# Patient Record
Sex: FEMALE | Race: WHITE | Employment: FULL TIME | URBAN - METROPOLITAN AREA
[De-identification: names, ages, dates, MRNs, and addresses within clinical notes are randomized per-mention and may not be internally consistent; named-entity substitution may affect disease eponyms.]

---

## 2020-04-17 ENCOUNTER — TELEMEDICINE (OUTPATIENT)
Dept: DERMATOLOGY | Age: 20
End: 2020-04-17
Payer: COMMERCIAL

## 2020-04-17 PROCEDURE — 99202 OFFICE O/P NEW SF 15 MIN: CPT | Performed by: DERMATOLOGY

## 2020-04-17 RX ORDER — TRIAMCINOLONE ACETONIDE 1 MG/G
CREAM TOPICAL
Qty: 80 G | Refills: 1 | Status: SHIPPED | OUTPATIENT
Start: 2020-04-17

## 2020-04-17 RX ORDER — VALACYCLOVIR HYDROCHLORIDE 500 MG/1
500 TABLET, FILM COATED ORAL 2 TIMES DAILY
Qty: 10 TABLET | Refills: 0 | Status: SHIPPED | OUTPATIENT
Start: 2020-04-17 | End: 2020-04-22

## 2020-05-05 ENCOUNTER — TELEMEDICINE (OUTPATIENT)
Dept: DERMATOLOGY | Age: 20
End: 2020-05-05
Payer: COMMERCIAL

## 2020-05-05 PROCEDURE — 99213 OFFICE O/P EST LOW 20 MIN: CPT | Performed by: DERMATOLOGY

## 2020-05-05 RX ORDER — PIMECROLIMUS 10 MG/G
CREAM TOPICAL
Qty: 30 G | Refills: 2 | Status: SHIPPED | OUTPATIENT
Start: 2020-05-05

## 2020-05-05 RX ORDER — DOXYCYCLINE HYCLATE 100 MG/1
CAPSULE ORAL
Qty: 60 CAPSULE | Refills: 0 | Status: SHIPPED | OUTPATIENT
Start: 2020-05-05 | End: 2020-07-14 | Stop reason: ALTCHOICE

## 2020-05-05 NOTE — PATIENT INSTRUCTIONS
Doxycycline twice daily with food and water  elidel cream twice daily    Follow up in person in 1 month

## 2020-05-05 NOTE — PROGRESS NOTES
TELEHEALTH EVALUATION -- Audio/Visual (During [facility])    Dermatology Patient Note  [address]  [address] [address]  Dept: [phone]  Dept Fax: [phone]      VISIT DATE: 5/5/2020   REFERRING PROVIDER: No ref. provider found      Lencoh Figueroa is a 23 y.o. female  who presents today in the office for:    No chief complaint on file. HISTORY OF PRESENT ILLNESS:  Patient presents for f/u facial papular rash  Interval history: minimal improvement. Some areas flattened but mostly still there. Denies picking or squeezing. Recently switched cleansers, now using drunk elephant gel cleanser  Current treatment and adherence: completed valtrex course and continues topical mupirocin  Prior treatments tried: see initial HPI      CURRENT MEDICATIONS:   Current Outpatient Medications   Medication Sig Dispense Refill    doxycycline hyclate (VIBRAMYCIN) 100 MG capsule Take 1 pill twice daily with food 60 capsule 0    pimecrolimus (ELIDEL) 1 % cream Apply topically 2 times daily. 30 g 2    triamcinolone (KENALOG) 0.1 % cream Apply to rash on abdomen twice daily (not face, armpit or groin) 80 g 1     No current facility-administered medications for this visit. ALLERGIES:   No Known Allergies    SOCIAL HISTORY:  Social History     Tobacco Use    Smoking status: Not on file   Substance Use Topics    Alcohol use: Not on file       REVIEW OF SYSTEMS:  Review of Systems  Skin:Denies any new changing, growing or bleeding lesions or rashes except as described in the HPI     PHYSICAL EXAM:   There were no vitals taken for this visit. General Exam:  General Appearance: No acute distress, Well nourished     Neuro: Alert and oriented to person, place and time  Psych: Normal affect   Lymph Node: Not performed    Cutaneous Exam: Performed as documented in clinic note below.   Sun-exposed skin,which includes the head/face, neck, both arms, digits and/or nails was examined. Due to this being a TeleHealth encounter, evaluation of the following organ systems is limited: Vitals/Constitutional/EENT/Resp/CV/GI//MS/Neuro/Skin/Heme-Lymph-Imm. In particular examination of the skin is limited by video quality and patient available technology. Pertinent Physical Exam Findings:  Physical Exam  Crusted papule of right upper vermillion border and pink patch of nasal columela with surrounding aceniform papules on vermillion border and acneiform nodules of right cheek    Medical Necessity of Exam Performed:   Distribution of patient concerns    Additional Diagnostic Testing performed during exam: Not performed ,  Not performed    ASSESSMENT:   Diagnosis Orders   1. Perioral dermatitis  doxycycline hyclate (VIBRAMYCIN) 100 MG capsule    pimecrolimus (ELIDEL) 1 % cream       Plan of Action is as Follows:  Assessment    1. Facial papular eruption not adequately responsive to valtrex and mupirocin. Ddx now includes perioral dermatitis and atypical acne  - trial of treatment for perioral dermatitis  - doxycycline hyclate (VIBRAMYCIN) 100 MG capsule; Take 1 pill twice daily with food  Dispense: 60 capsule; Refill: 0  - pimecrolimus (ELIDEL) 1 % cream; Apply topically 2 times daily. Dispense: 30 g; Refill: 2    RTC 1 month in person          There are no Patient Instructions on file for this visit. Photo surveillance performed: No    Follow-up: No follow-ups on file. Pursuant to the emergency declaration under the [address] [place] [address], [facility] waiver authority and the [employer] Act, this Virtual  Visit was conducted, with patient's consent, to reduce the patient's risk of exposure to COVID-19 and provide continuity of care for an established patient. Services were provided through a video synchronous discussion virtually to substitute for in-person clinic visit.

## 2020-05-21 ENCOUNTER — TELEPHONE (OUTPATIENT)
Dept: ADMINISTRATIVE | Age: 20
End: 2020-05-21

## 2020-05-21 NOTE — TELEPHONE ENCOUNTER
Patient calling to schedule F2F f/u appointment as instructed by Sergey Scott at [place] [id number] on 5/5/20. Writer unable to reach staff via [phone] [employer] on 5/21 for permission to schedule in a \"Hold\" slot. Writer informed patient clinical staff will contact her to schedule f/u appointment.

## 2020-05-21 NOTE — TELEPHONE ENCOUNTER
Future Appointments   Date Time Provider Eli Hernandez   6/3/2020  8:45 AM Buckner Osgood, MD  derm [facility]

## 2020-06-03 ENCOUNTER — OFFICE VISIT (OUTPATIENT)
Dept: DERMATOLOGY | Age: 20
End: 2020-06-03
Payer: COMMERCIAL

## 2020-06-03 VITALS
OXYGEN SATURATION: 98 % | TEMPERATURE: 97.8 F | BODY MASS INDEX: 27.47 KG/M2 | DIASTOLIC BLOOD PRESSURE: 83 MMHG | HEART RATE: 84 BPM | SYSTOLIC BLOOD PRESSURE: 120 MMHG | WEIGHT: 175 LBS | HEIGHT: 67 IN

## 2020-06-03 PROCEDURE — 10060 I&D ABSCESS SIMPLE/SINGLE: CPT | Performed by: DERMATOLOGY

## 2020-06-03 PROCEDURE — 99213 OFFICE O/P EST LOW 20 MIN: CPT | Performed by: DERMATOLOGY

## 2020-06-03 RX ORDER — TRANEXAMIC ACID 650 1/1
1300 TABLET ORAL 3 TIMES DAILY
COMMUNITY
Start: 2019-05-15

## 2020-06-03 RX ORDER — NORETHINDRONE AND ETHINYL ESTRADIOL 0.4-0.035
KIT ORAL
COMMUNITY
Start: 2018-02-19

## 2020-06-03 RX ORDER — FERROUS SULFATE 325(65) MG
325 TABLET ORAL
COMMUNITY
Start: 2018-08-16

## 2020-06-03 RX ORDER — ASCORBIC ACID 250 MG
2 TABLET ORAL DAILY
COMMUNITY

## 2020-06-03 RX ORDER — SULFACETAMIDE SODIUM 100 MG/ML
LOTION TOPICAL
Qty: 118 ML | Refills: 2 | Status: SHIPPED | OUTPATIENT
Start: 2020-06-03 | End: 2020-07-14 | Stop reason: SDUPTHER

## 2020-06-03 NOTE — PROGRESS NOTES
Smokeless tobacco: Never Used   Substance Use Topics    Alcohol use: Not on file       REVIEW OF SYSTEMS:  Review of Systems  Skin: Denies any new changing, growing orbleeding lesions or rashes except as described in the HPI   Constitutional: Denies fevers, chills, and malaise. PHYSICAL EXAM:   /83 (Site: Left Upper Arm, Position: Sitting, Cuff Size: Medium Adult)   Pulse 84   Temp 97.8 °F (36.6 °C)   Ht 5' 7\" (1.702 m)   Wt 175 lb (79.4 kg)   LMP 05/10/2020 (Exact Date)   SpO2 98%   BMI 27.41 kg/m²     General Exam:  General Appearance: No acute distress, Well nourished     Neuro: Alert and oriented to person, place and time  Psych: Normal affect   Lymph Node: Not performed    Cutaneous Exam: Performed as documented in clinic note below. Sun-exposed skin, which includes the head/face, neck, both arms, digits and/or nails was examined. Pertinent Physical Exam Findings:  Physical Exam  Upper lip with ill-defined erythematous patches spreading from true lip across vermillion border to cutaneous lip, with few subtle inlying papules, especially at vermillion    Photo surveillance performed: No    Medical Necessity of Exam Performed:   Distribution of patient concerns    Additional Diagnostic Testing performed during exam: Not performed ,  Not performed    ASSESSMENT:   Diagnosis Orders   1. Cheilitis  HSV PCR    Culture, Wound    Sulfacetamide Sodium, Acne, 10 % LOTN       Plan of Action is as Follows:  Assessment   1. Dermatitis of central face, now focused to upper lip and resolved on columella. Has not responded completely any of treatments thus far including valtrex, mupirocin, doxycycline and elidel. Ddx includes persistent perioral dermatitis, irritant/allergic cheilitis, perleche. During previous episodes has tried acne topicals and oral antibiotics without improvement  - culture today, start sulfacetamide sodium  - HSV PCR; Future  - Culture, Wound;  Future  - completely doxycycline, then

## 2020-06-04 ENCOUNTER — HOSPITAL ENCOUNTER (OUTPATIENT)
Age: 20
Setting detail: SPECIMEN
Discharge: HOME | End: 2020-06-04
Payer: COMMERCIAL

## 2020-06-04 ENCOUNTER — TELEPHONE (OUTPATIENT)
Dept: DERMATOLOGY | Age: 20
End: 2020-06-04

## 2020-06-05 NOTE — TELEPHONE ENCOUNTER
Specimens are back, I did not know that you could not send Cultures to [facility] because pt hat [employer]. Would you like for pt come back on dispose of the cultures. I apologize that was my mistake, I did not know you could send these only to [facility].

## 2020-06-06 LAB
CULTURE: ABNORMAL
DIRECT EXAM: ABNORMAL
DIRECT EXAM: ABNORMAL
Lab: ABNORMAL
SPECIMEN DESCRIPTION: ABNORMAL

## 2020-06-07 NOTE — TELEPHONE ENCOUNTER
Please inform patient that culture grew normal skin elizabeth. Continue sodium sulfacetamide lotion and let me know how it goes.

## 2020-06-11 LAB
HSV BY PCR: NOT DETECTED
HSV SOURCE: NORMAL

## 2020-07-14 ENCOUNTER — OFFICE VISIT (OUTPATIENT)
Dept: DERMATOLOGY | Age: 20
End: 2020-07-14
Payer: COMMERCIAL

## 2020-07-14 VITALS
OXYGEN SATURATION: 98 % | HEART RATE: 73 BPM | BODY MASS INDEX: 27.94 KG/M2 | SYSTOLIC BLOOD PRESSURE: 130 MMHG | WEIGHT: 178 LBS | DIASTOLIC BLOOD PRESSURE: 84 MMHG | HEIGHT: 67 IN | TEMPERATURE: 97.1 F

## 2020-07-14 PROCEDURE — 99213 OFFICE O/P EST LOW 20 MIN: CPT | Performed by: DERMATOLOGY

## 2020-07-14 RX ORDER — SULFACETAMIDE SODIUM 100 MG/ML
LOTION TOPICAL
Qty: 118 ML | Refills: 2 | Status: SHIPPED | OUTPATIENT
Start: 2020-07-14

## 2020-07-14 RX ORDER — FLUCONAZOLE 200 MG/1
TABLET ORAL
Qty: 2 TABLET | Refills: 0 | Status: SHIPPED | OUTPATIENT
Start: 2020-07-14 | End: 2020-10-07 | Stop reason: ALTCHOICE

## 2020-07-14 RX ORDER — DOXYCYCLINE HYCLATE 100 MG/1
CAPSULE ORAL
Qty: 30 CAPSULE | Refills: 2 | Status: SHIPPED | OUTPATIENT
Start: 2020-07-14

## 2020-07-14 NOTE — PROGRESS NOTES
Dermatology Patient Note  [facility] [address]  [address]  [facility] [address]  Dept: [phone]  Dept Fax: [phone]: 7/14/2020   REFERRING PROVIDER: No ref. provider found      Red Bowden is a 23 y.o. female  who presents today in the office for:    Follow-up (pt is not resolved; completed doxy x3 weeks; did not get sulfacetamide lotion, still using elidel and mupirocin with no relief)      HISTORY OF PRESENT ILLNESS:  Patient presents for f/u acneiform eruption of face  Interval history: patient continues to have outbreaks on face and especially upper lip and nose, start as bump and heal as red spots. Patient reports that she got a recurrence of rash of umbilicus when face flared  Current treatment and adherence: elidel and mupirocin not helping. Did not get sodium sulfacetamide because [place] left [place] [place]  Prior treatments tried: see initial HPI  Also complains of irritated skin tags and would like treatment    CURRENT MEDICATIONS:   Current Outpatient Medications   Medication Sig Dispense Refill    mupirocin (BACTROBAN) 2 % ointment Apply topically 3 times daily Apply topically 3 times daily.  Sulfacetamide Sodium, Acne, 10 % LOTN Apply daily to rash on face 118 mL 2    metroNIDAZOLE (METROCREAM) 0.75 % cream Apply daily to rash on face 45 g 3    fluconazole (DIFLUCAN) 200 MG tablet Take 200 mg once. Repeat in 1 week 2 tablet 0    doxycycline hyclate (VIBRAMYCIN) 100 MG capsule Take 1 pill daily with food 30 capsule 2    ferrous sulfate (IRON 325) 325 (65 Fe) MG tablet Take 325 mg by mouth      ascorbic acid (VITAMIN C) 250 MG tablet Take 2 tablets by mouth daily      norethindrone-ethinyl estradiol (BALZIVA) 0.4-35 MG-MCG per tablet       tranexamic acid (LYSTEDA) 650 MG TABS tablet Take 1,300 mg by mouth 3 times daily      pimecrolimus (ELIDEL) 1 % cream Apply topically 2 times daily.  30 g 2    triamcinolone (KENALOG) 0.1 elidel, valtrex  HSV PCR and wound culture were negative  - restart doxycycline for a longer course at 100 mg daily  - start sodium sulfacetamide and metrocream  - add two doses of fluconazole  - Sulfacetamide Sodium, Acne, 10 % LOTN; Apply daily to rash on face  Dispense: 118 mL; Refill: 2  - metroNIDAZOLE (METROCREAM) 0.75 % cream; Apply daily to rash on face  Dispense: 45 g; Refill: 3  - fluconazole (DIFLUCAN) 200 MG tablet; Take 200 mg once. Repeat in 1 week  Dispense: 2 tablet; Refill: 0  - doxycycline hyclate (VIBRAMYCIN) 100 MG capsule; Take 1 pill daily with food  Dispense: 30 capsule; Refill: 2    3. Inflamed acrochordon  - schedule for removal (send to path    RTC 2 months or for skin tag removal            Patient Instructions   -Start Metrocream daily  -Start Sulfacetamide daily  -Start Doxycycline once a day. -Take oral Diflucan one this week and repeat in one week  -[facility] have a [facility]  -Follow up in 2 months  -Follow up for skin tag removal        Follow-up: No follow-ups on file. This note was created with the assistance of a speech-recognition program.  Although the intention is to generate a document that actually reflects the content of the visit, no guarantees can be provided that every mistake has been identified and corrected byediting.     Electronically signed by Meenakshi Kirby MD on 6/3/20 at 9:28 AM EDT

## 2020-07-28 ENCOUNTER — OFFICE VISIT (OUTPATIENT)
Dept: DERMATOLOGY | Age: 20
End: 2020-07-28
Payer: COMMERCIAL

## 2020-07-28 VITALS
WEIGHT: 178 LBS | HEART RATE: 75 BPM | HEIGHT: 67 IN | SYSTOLIC BLOOD PRESSURE: 126 MMHG | BODY MASS INDEX: 27.94 KG/M2 | OXYGEN SATURATION: 98 % | DIASTOLIC BLOOD PRESSURE: 85 MMHG

## 2020-07-28 PROCEDURE — 11200 RMVL SKIN TAGS UP TO&INC 15: CPT | Performed by: DERMATOLOGY

## 2020-07-28 RX ORDER — LIDOCAINE HYDROCHLORIDE AND EPINEPHRINE 10; 10 MG/ML; UG/ML
1 INJECTION, SOLUTION INFILTRATION; PERINEURAL ONCE
Status: COMPLETED | OUTPATIENT
Start: 2020-07-28 | End: 2020-07-28

## 2020-07-28 RX ADMIN — LIDOCAINE HYDROCHLORIDE AND EPINEPHRINE 1 ML: 10; 10 INJECTION, SOLUTION INFILTRATION; PERINEURAL at 09:57

## 2020-07-28 NOTE — PROGRESS NOTES
Chief Complaint   Patient presents with    Follow-up     skin tags         Dermatology Procedure Note   [address]  [facility] [address]  Dept: [phone]  Dept Fax: [phone]      Procedure Date: 7/28/2020  Procedure Time: 9:36 AM    Procedure Practitioner: Ok Smith MD    Procedure: Skin Tag Removal    Pre-Procedure Diagnosis: Inflamed acrochordon    Post-Procedure Diagnosis: Same as Pre-Procedure Diagnosis    Informed Consent: The procedure and its risks were explained including but not limited to pain, bleeding, infection, permanent scar, permanent pigment alteration and recurrence. Consent to proceed with the procedure was obtained from the patient or the parent by the practitioner    Time Out:  A time out was conducted immediately before starting the procedure that confirmed a final verification of the correct patient, correct procedure, and correct site. Procedure Details:  After cleaning with alcohol and anesthetizing with lidocaine 1% with epinephrine, 9 irritated and inflamed skin tag in the buttock was removed with iris scissors. Hemostasis was achieved with pressure and aluminum chloride. Vaseline and a bandage were applied.     Procedure Performed By: Ok Smith MD    Estimated Blood Loss: Minimal    Pathologic Specimen: H&E (single lesion sent to path)    Procedure Tolerance: Good    Complication(s): None    Electronically signed by Ok Smith MD on 7/28/20 at 9:36 AM EDT

## 2020-07-28 NOTE — PATIENT INSTRUCTIONS

## 2020-09-25 ENCOUNTER — TELEMEDICINE (OUTPATIENT)
Dept: DERMATOLOGY | Age: 20
End: 2020-09-25
Payer: COMMERCIAL

## 2020-09-25 PROCEDURE — 99213 OFFICE O/P EST LOW 20 MIN: CPT | Performed by: DERMATOLOGY

## 2020-09-25 NOTE — PROGRESS NOTES
TELEHEALTH EVALUATION -- Audio/Visual (During [facility]-81 public health emergency)    Dermatology Patient Note  [address]  [address]  Dept: [phone]  Dept Fax: [phone]      VISIT DATE: 9/25/2020   REFERRING PROVIDER: No ref. provider found      Mil Donis is a 23 y.o. female  who presents today in the office for:    No chief complaint on file. HISTORY OF PRESENT ILLNESS:  Patient presents for f/u acneiform facial eruption  Interval history: patient states the rash around her mouth and lips is much improved, but she still has the persistent acne bump on her right cheek  Current treatment and adherence: doxycycline 100 mg daily, sodium sulfacetamide lotion and metrocream. Finished diflucan x 2 doses and states that it really got better after the second dose. The rash in her navel recurred. The triamcinolone helps but then it comes back  Prior treatments tried: see initial HPI      CURRENT MEDICATIONS:   Current Outpatient Medications   Medication Sig Dispense Refill    mupirocin (BACTROBAN) 2 % ointment Apply topically 3 times daily Apply topically 3 times daily.  Sulfacetamide Sodium, Acne, 10 % LOTN Apply daily to rash on face 118 mL 2    metroNIDAZOLE (METROCREAM) 0.75 % cream Apply daily to rash on face 45 g 3    fluconazole (DIFLUCAN) 200 MG tablet Take 200 mg once. Repeat in 1 week 2 tablet 0    doxycycline hyclate (VIBRAMYCIN) 100 MG capsule Take 1 pill daily with food 30 capsule 2    ferrous sulfate (IRON 325) 325 (65 Fe) MG tablet Take 325 mg by mouth      ascorbic acid (VITAMIN C) 250 MG tablet Take 2 tablets by mouth daily      norethindrone-ethinyl estradiol (BALZIVA) 0.4-35 MG-MCG per tablet       tranexamic acid (LYSTEDA) 650 MG TABS tablet Take 1,300 mg by mouth 3 times daily      pimecrolimus (ELIDEL) 1 % cream Apply topically 2 times daily.  (Patient not taking: Reported on 7/28/2020) 30 g 2    triamcinolone (KENALOG) 0.1 % cream Apply to rash on abdomen twice daily (not face, armpit or groin) 80 g 1     No current facility-administered medications for this visit. ALLERGIES:   No Known Allergies    SOCIAL HISTORY:  Social History     Tobacco Use    Smoking status: Never Smoker    Smokeless tobacco: Never Used   Substance Use Topics    Alcohol use: Not on file       REVIEW OF SYSTEMS:  Review of Systems  Skin:Denies any new changing, growing or bleeding lesions or rashes except as described in the HPI     PHYSICAL EXAM:   There were no vitals taken for this visit. General Exam:  General Appearance: No acute distress, Well nourished     Neuro: Alert and oriented to person, place and time  Psych: Normal affect   Lymph Node: Not performed    Cutaneous Exam: Performed as documented in clinic note below. Acne exam, which includes the head/face, neck, chest, and back was performed    Due to this being a TeleHealth encounter, evaluation of the following organ systems is limited: Vitals/Constitutional/EENT/Resp/CV/GI//MS/Neuro/Skin/Heme-Lymph-Imm. In particular examination of the skin is limited by video quality and patient available technology. Pertinent Physical Exam Findings:  Physical Exam  Acneiform papule of right cheek  Navel with scaly pink plaque     Medical Necessity of Exam Performed:   Distribution of patient concerns    Additional Diagnostic Testing performed during exam: Not performed ,  Not performed    ASSESSMENT:   Diagnosis Orders   1. Other specified dermatitis         Plan of Action is as Follows:  Assessment    1.  Atypical acneiform eruption of face  Tried and failed: mupirocin ointment, elidel, valtrex  HSV PCR and wound culture were negative  Now responding after diflucan 200 mg x 2, doxycycline 100 mg daily x 2 months, sodium sulfacetamide lotion and metrocream  Patient reports persistent papule of right cheek - suspect acneiform scar  Plan for ILK and if any exudate culture this papule    2. Dermatitis of navel - suspect psoriasis given location and morphology  - continue triamcinolone cream as needed. Discussed that psoriasis is a chronic disease that requires maintenance treatment    RTC for ILK +/- culture          There are no Patient Instructions on file for this visit. Photo surveillance performed: No    Follow-up: No follow-ups on file. Pursuant to the emergency declaration under the [address] [place] [address], [facility] waiver authority and the [employer] Act, this Virtual  Visit was conducted, with patient's consent, to reduce the patient's risk of exposure to COVID-19 and provide continuity of care for an established patient. Services were provided through a video synchronous discussion virtually to substitute for in-person clinic visit.      Electronically signed by Shayne Chaudhari MD on 9/25/20 at 7:48 AM EDT

## 2020-09-30 ENCOUNTER — TELEPHONE (OUTPATIENT)
Dept: DERMATOLOGY | Age: 20
End: 2020-09-30

## 2020-09-30 NOTE — TELEPHONE ENCOUNTER
----- Message from Aramis Carrion MD sent at 9/25/2020  8:04 AM EDT -----  Schedule in person for ILK +/- culture

## 2020-11-25 ENCOUNTER — OFFICE VISIT (OUTPATIENT)
Dept: DERMATOLOGY | Age: 20
End: 2020-11-25
Payer: COMMERCIAL

## 2020-11-25 ENCOUNTER — HOSPITAL ENCOUNTER (OUTPATIENT)
Age: 20
Setting detail: SPECIMEN
Discharge: HOME | End: 2020-11-25
Payer: COMMERCIAL

## 2020-11-25 VITALS
SYSTOLIC BLOOD PRESSURE: 116 MMHG | WEIGHT: 187.2 LBS | OXYGEN SATURATION: 93 % | HEART RATE: 67 BPM | BODY MASS INDEX: 30.08 KG/M2 | DIASTOLIC BLOOD PRESSURE: 67 MMHG | HEIGHT: 66 IN | TEMPERATURE: 97.4 F

## 2020-11-25 PROCEDURE — 99213 OFFICE O/P EST LOW 20 MIN: CPT | Performed by: DERMATOLOGY

## 2020-11-25 RX ORDER — FLUCONAZOLE 200 MG/1
TABLET ORAL
Qty: 2 TABLET | Refills: 0 | Status: SHIPPED | OUTPATIENT
Start: 2020-11-25

## 2020-11-25 NOTE — PATIENT INSTRUCTIONS
regarding the dangers of becoming pregnant while on isotretinoin. If you are a male, you should use condoms if you are sexually active to prevent pregnancy in your partner while on isotretinoin. A small amount of the isotretinoin drug is present in the semen of men who take it, and it is important not to expose females to this isotretinoin during sexual intercourse. You must never share your medication with anyone else. You must also never donate your blood while on isotretinoin and for one month after. Other potential side effects:  Common side effects that may occur during the course of treatment include severely chapped lips, nose bleeds, eye dryness, dry skin, hair thinning, joint aches, and muscle aches. While patients are on isotretinoin, their skin may heal poorly or more slowly. Patients are also very sensitive to the sun and at risk of having severe sunburns while taking isotretinoin. Regular use of a lip balm, Vaseline, or Aquaphor to the lips is important to prevent excess chapping. Vaseline can be put in the nostrils at night to prevent nose bleeds. Facial moisturizers that are noncomedogenic (wont clog pores) can be used on the face and regular moisturizers can be used on the body. Patients can sometimes not use their contact lenses while on isotretinoin and may need to use eye lubricants or artificial tears. Patients can sometimes experience nearsightedness when driving at night. Over the counter ibuprofen is fine to take for muscle and joint pain. Avoid Tylenol or acetaminophen. Please notify your physician if muscle or joint pain is not controlled with over the counter ibuprofen. Avoid body piercing, and elective procedures that involve cutting or lasering the skin while on isotretinoin. Avoid prolonged sun exposure and wear sunscreen and sun protective clothing to prevent sunburns especially during spring and summer months.   Other rare but more serious side effects may occur on isotretinoin. These include depression or other mood disorders, increased production of spinal fluid, inflammation of the liver, inflammation of the pancreas, elevated cholesterol or triglyceride levels, and blood cell abnormalities. Although these side effects are rare and most patients do not develop them, patients on isotretinoin need to be monitored closely with monthly labs and monthly visits with your doctor. It is important to never drink alcohol while on isotretinoin as this may increase the likelihood of inflammation of the liver. It is important to be honest with us about any changes in your mood, depression, or suicidal thoughts while on isotretinion. We also need to be made aware of recurrent or severe headaches that do not respond to over the counter medications or are associated with blurry vision. The labs must be obtained after fasting, meaning no food or drink other than water for 12 hours before the test. We cannot refill your isotretinoin unless you return for your monthly appointments and have your monthly labs performed. If you have inflammatory bowel disease, taking isotretinoin can worsen your symptoms. Some people have developed inflammatory bowel disease while on isotretinoin or after stopping it. Studies have not concluded that there is a direct causative relationship between isotretinoin and inflammatory bowel disease. Other medications:   Patients should stop all other acne medications while on isotretinoin including both oral and topical medications. Your dermatologist will review your other medications to make sure these are safe to continue while on isotretinoin. Please notify all physicians that treat you that you are on isotretinoin so that they are aware of this when prescribing new medications. Do not take a multivitamin or vitamin A supplement while on isotretinoin.

## 2020-11-26 LAB
HSV 1, NAAT: NEGATIVE
HSV 2, NAAT: NEGATIVE
SPECIMEN DESCRIPTION: NORMAL

## 2020-11-27 LAB
CULTURE: NO GROWTH
DIRECT EXAM: NORMAL
DIRECT EXAM: NORMAL
Lab: NORMAL
SPECIMEN DESCRIPTION: NORMAL

## 2020-11-27 NOTE — PROGRESS NOTES
Dermatology Patient Note  [facility]  [address]  [address] [facility] [address]  Dept: [phone]  Dept Fax: [phone]      VISIT DATE: 11/25/2020   REFERRING PROVIDER: No ref. provider found      Korey Richardson is a 23 y.o. female  who presents today in the office for:    Follow-up (New upper lip lesion that started yesterday. Lesion on right side of chi since March. Has been injected on 7/28/2020.  )      HISTORY OF PRESENT ILLNESS:  Patient presents for f/u acneiform facial eruption  Interval history: recently had a breakout on her left upper lip. The right cheek papulonodule has mostly healed  Current treatment and adherence: sulfacetamide and ketoconazole cream.   Prior treatments tried: see initial HPI      CURRENT MEDICATIONS:   Current Outpatient Medications   Medication Sig Dispense Refill    fluconazole (DIFLUCAN) 200 MG tablet Take one tablet PO, repeat in one week 2 tablet 0    ketoconazole (NIZORAL) 2 % cream Apply twice daily to affected areas 30 g 2    ferrous sulfate (IRON 325) 325 (65 Fe) MG tablet Take 325 mg by mouth      ascorbic acid (VITAMIN C) 250 MG tablet Take 2 tablets by mouth daily      norethindrone-ethinyl estradiol (BALZIVA) 0.4-35 MG-MCG per tablet       mupirocin (BACTROBAN) 2 % ointment Apply topically 3 times daily Apply topically 3 times daily.  Sulfacetamide Sodium, Acne, 10 % LOTN Apply daily to rash on face (Patient not taking: Reported on 11/25/2020) 118 mL 2    metroNIDAZOLE (METROCREAM) 0.75 % cream Apply daily to rash on face (Patient not taking: Reported on 11/25/2020) 45 g 3    doxycycline hyclate (VIBRAMYCIN) 100 MG capsule Take 1 pill daily with food (Patient not taking: Reported on 11/25/2020) 30 capsule 2    tranexamic acid (LYSTEDA) 650 MG TABS tablet Take 1,300 mg by mouth 3 times daily      pimecrolimus (ELIDEL) 1 % cream Apply topically 2 times daily.  (Patient not taking: Reported on 7/28/2020) 30 g 2  triamcinolone (KENALOG) 0.1 % cream Apply to rash on abdomen twice daily (not face, armpit or groin) (Patient not taking: Reported on 11/25/2020) 80 g 1     No current facility-administered medications for this visit. ALLERGIES:   No Known Allergies    SOCIAL HISTORY:  Social History     Tobacco Use    Smoking status: Never Smoker    Smokeless tobacco: Never Used   Substance Use Topics    Alcohol use: Not on file       REVIEW OF SYSTEMS:  Review of Systems  Skin:Denies any new changing, growing or bleeding lesions or rashes except as described in the HPI     PHYSICAL EXAM:   /67 (Site: Left Upper Arm, Position: Sitting, Cuff Size: Medium Adult)   Pulse 67   Temp 97.4 °F (36.3 °C)   Ht 5' 6\" (1.676 m)   Wt 187 lb 3.2 oz (84.9 kg)   SpO2 93%   BMI 30.21 kg/m²     General Exam:  General Appearance: No acute distress, Well nourished     Neuro: Alert and oriented to person, place and time  Psych: Normal affect   Lymph Node: Not performed    Cutaneous Exam: Performed as documented in clinic note below. Acne exam, which includes the head/face, neck, chest, and back was performed      Pertinent Physical Exam Findings:  Physical Exam  Left upper vermillion border with pink papule with two small pustules inlying  Right cheek with hyperpigmented macule with slight scarred nodule on palpation  Navel with slightly erythematous patch     Medical Necessity of Exam Performed:   Distribution of patient concerns    Additional Diagnostic Testing performed during exam: Not performed ,  Not performed    ASSESSMENT:   Diagnosis Orders   1. Perioral dermatitis  Culture, Wound    Herpes Simplex 1 & 2, Molecular    fluconazole (DIFLUCAN) 200 MG tablet   2. Other specified dermatitis  Culture, Wound       Plan of Action is as Follows:  Assessment    1.  Atypical acneiform eruption of face  Tried and failed: mupirocin ointment, elidel, valtrex, sodium sulfacetamide, metrocream. Had partial response to doxycycline 100 mg daily x 3 months  Believes she got the most improvement from diflucan 200 mg weekly x 2 doses  HSV PCR and wound culture were previously negative    Plan:  Repeat culture today  Repeat dose of diflucan, continue ketoconazole cream and sodium sulfacetamide  If cultures do not explain rash, consider treating as atypical rosacea with isotretinoin  - Culture, Wound; Future  - Herpes Simplex 1 & 2, Molecular; Future  - fluconazole (DIFLUCAN) 200 MG tablet; Take one tablet PO, repeat in one week  Dispense: 2 tablet; Refill: 0    2. Dermatitis of navel  - Culture, Wound; Future    RTC pending results             Patient Instructions   - Diflucan orally  - Apply ketoconazole cream twice daily  - Sodium sulfacetamide wash    Patient Education Regarding Isotretinion    Isotretinoin is a good medication for many people struggling with severe acne. Most people tolerate it well. However, isotretinoin is a medication that does have some potentially serious side effects. The most serious side effect occurs when someone who is pregnant takes isotretinoin; therefore, someone who is pregnant must never be exposed to isotretinoin. Taking isotretinoin while pregnant has a high chance of causing severe birth defects or deformities in the baby. Because of this serious effect, a national program called I-pledge was developed to closely monitor the use of Isotretinoin. Everyone started on isotretinoin, male or female, must be enrolled in the I pledge program which pledges to prevent pregnancy in those taking isotretinoin. I-pledge Counseling Reviewed: If you are a female and become pregnant on isotretinoin, there is a high likelihood that the baby will have serious birth defects. Therefore, in order to be started on isotretinoin, females must be on 2 forms of birth control. The types of birth control acceptable for isotretinoin use will be discussed with you by your physician.  It is important that you remain on the 2 forms of birth control the entire time that you are on isotretinoin and for one month after stopping isotretinoin. If you have sexual intercourse without using the 2 forms of birth control or if there is any chance that you could be pregnant, it is important that you immediately stop your isotretinoin and notify your physician. You must also have a pregnancy test monthly. While on isotretinoin, you must never share your medication with anyone else. You must also never donate your blood while on isotretinoin and for one month after. Prior to filling your prescription each month, you will need to take an online test to verify your knowledge regarding the dangers of becoming pregnant while on isotretinoin. If you are a male, you should use condoms if you are sexually active to prevent pregnancy in your partner while on isotretinoin. A small amount of the isotretinoin drug is present in the semen of men who take it, and it is important not to expose females to this isotretinoin during sexual intercourse. You must never share your medication with anyone else. You must also never donate your blood while on isotretinoin and for one month after. Other potential side effects:  Common side effects that may occur during the course of treatment include severely chapped lips, nose bleeds, eye dryness, dry skin, hair thinning, joint aches, and muscle aches. While patients are on isotretinoin, their skin may heal poorly or more slowly. Patients are also very sensitive to the sun and at risk of having severe sunburns while taking isotretinoin. Regular use of a lip balm, Vaseline, or Aquaphor to the lips is important to prevent excess chapping. Vaseline can be put in the nostrils at night to prevent nose bleeds. Facial moisturizers that are noncomedogenic (wont clog pores) can be used on the face and regular moisturizers can be used on the body.  Patients can sometimes not use their contact lenses while on isotretinoin and may need to use eye lubricants or artificial tears. Patients can sometimes experience nearsightedness when driving at night. Over the counter ibuprofen is fine to take for muscle and joint pain. Avoid Tylenol or acetaminophen. Please notify your physician if muscle or joint pain is not controlled with over the counter ibuprofen. Avoid body piercing, and elective procedures that involve cutting or lasering the skin while on isotretinoin. Avoid prolonged sun exposure and wear sunscreen and sun protective clothing to prevent sunburns especially during spring and summer months. Other rare but more serious side effects may occur on isotretinoin. These include depression or other mood disorders, increased production of spinal fluid, inflammation of the liver, inflammation of the pancreas, elevated cholesterol or triglyceride levels, and blood cell abnormalities. Although these side effects are rare and most patients do not develop them, patients on isotretinoin need to be monitored closely with monthly labs and monthly visits with your doctor. It is important to never drink alcohol while on isotretinoin as this may increase the likelihood of inflammation of the liver. It is important to be honest with us about any changes in your mood, depression, or suicidal thoughts while on isotretinion. We also need to be made aware of recurrent or severe headaches that do not respond to over the counter medications or are associated with blurry vision. The labs must be obtained after fasting, meaning no food or drink other than water for 12 hours before the test. We cannot refill your isotretinoin unless you return for your monthly appointments and have your monthly labs performed. If you have inflammatory bowel disease, taking isotretinoin can worsen your symptoms. Some people have developed inflammatory bowel disease while on isotretinoin or after stopping it.   Studies have not concluded that there is a direct causative relationship between isotretinoin and inflammatory bowel disease. Other medications:   Patients should stop all other acne medications while on isotretinoin including both oral and topical medications. Your dermatologist will review your other medications to make sure these are safe to continue while on isotretinoin. Please notify all physicians that treat you that you are on isotretinoin so that they are aware of this when prescribing new medications. Do not take a multivitamin or vitamin A supplement while on isotretinoin. Photo surveillance performed: No    Follow-up: No follow-ups on file. Pursuant to the emergency declaration under the [address] [place] [address], [facility] waiver authority and the [employer] Act, this Virtual  Visit was conducted, with patient's consent, to reduce the patient's risk of exposure to COVID-19 and provide continuity of care for an established patient. Services were provided through a video synchronous discussion virtually to substitute for in-person clinic visit.      Electronically signed by Beatrice García MD on 9/25/20 at 7:48 AM EDT

## 2020-11-28 LAB
CULTURE: ABNORMAL
CULTURE: ABNORMAL
DIRECT EXAM: ABNORMAL
DIRECT EXAM: ABNORMAL
Lab: ABNORMAL
SPECIMEN DESCRIPTION: ABNORMAL

## 2020-11-30 ENCOUNTER — PATIENT MESSAGE (OUTPATIENT)
Dept: DERMATOLOGY | Age: 20
End: 2020-11-30

## 2020-12-04 RX ORDER — VALACYCLOVIR HYDROCHLORIDE 1 G/1
TABLET, FILM COATED ORAL
Qty: 16 TABLET | Refills: 0 | Status: SHIPPED | OUTPATIENT
Start: 2020-12-04

## 2020-12-04 NOTE — TELEPHONE ENCOUNTER
From: Yadi Luke  Sent: 12/3/2020 4:30 PM EST  To: [facility] Clinical Staff  Subject: RE: Culture results    Charlette,    I have looked a lot into isotretinoin over the past few days, and I am 100% on board if you think this is the next step. Thanks so much too for offering to help me find a dermatologist, I would appreciate any help or if you are able to provide any suggestions. Im also not sure how that process would work to coordinate getting my medical information from you to a new doctor so any assistance you can provide would be great! Thanks for all your help,  BODØ      ----- Message -----  From: Emeka Yu MD  Sent: 11/30/20, 3:34 PM  To: Yadi Luke  Subject: Culture results    Janna Nelson,    The cultures from your face were negative. Did not grow bacteria and no virus were detected. As I suspected, the culture from your navel grew some bacteria - Staph and Group B Strep to be exact. Both of these are common \"colonizers\" of the skin - they like to \"live\" in warm, moist areas of skin but rarely cause true infections in this case. Long story short, I don't think the rash is caused by these bacteria. Mupirocin will eliminate the carriage of the bacteria while you're using it, so try that on your belly button to see if it makes a difference. I believe the next step for your face is isotretinoin. It is a commitment - about 6 months of treatment and monthly visits. If you're interested in this, let me know and I can try to find you a dermatologist in [facility] to carry the torch.     Sincerely,  Emeka Yu

## 2020-12-08 ENCOUNTER — TELEPHONE (OUTPATIENT)
Dept: DERMATOLOGY | Age: 20
End: 2020-12-08

## 2020-12-11 NOTE — TELEPHONE ENCOUNTER
I put in a referral to [address] [place] [address] for acne. I also wrote a letter that I would like mailed to the patient so she can take it to her appointment. Thank you!   Brien Araujo

## 2023-10-31 ENCOUNTER — HOSPITAL ENCOUNTER (OUTPATIENT)
Dept: RADIOLOGY | Facility: EXTERNAL LOCATION | Age: 23
Discharge: HOME | End: 2023-10-31
Payer: COMMERCIAL

## 2023-10-31 DIAGNOSIS — R10.32 LEFT LOWER QUADRANT ABDOMINAL PAIN: ICD-10-CM

## 2023-12-04 ENCOUNTER — APPOINTMENT (OUTPATIENT)
Dept: UROLOGY | Facility: CLINIC | Age: 23
End: 2023-12-04
Payer: COMMERCIAL

## 2023-12-12 PROCEDURE — 87636 SARSCOV2 & INF A&B AMP PRB: CPT

## 2023-12-13 ENCOUNTER — LAB REQUISITION (OUTPATIENT)
Dept: LAB | Facility: HOSPITAL | Age: 23
End: 2023-12-13
Payer: COMMERCIAL

## 2023-12-13 DIAGNOSIS — R05.9 COUGH, UNSPECIFIED: ICD-10-CM

## 2023-12-13 LAB
FLUAV RNA RESP QL NAA+PROBE: NOT DETECTED
FLUBV RNA RESP QL NAA+PROBE: NOT DETECTED
SARS-COV-2 RNA RESP QL NAA+PROBE: NOT DETECTED